# Patient Record
Sex: MALE | Race: WHITE | URBAN - METROPOLITAN AREA
[De-identification: names, ages, dates, MRNs, and addresses within clinical notes are randomized per-mention and may not be internally consistent; named-entity substitution may affect disease eponyms.]

---

## 2017-06-07 ENCOUNTER — APPOINTMENT (RX ONLY)
Dept: URBAN - METROPOLITAN AREA CLINIC 70 | Facility: CLINIC | Age: 82
Setting detail: DERMATOLOGY
End: 2017-06-07

## 2017-06-07 ENCOUNTER — APPOINTMENT (RX ONLY)
Dept: URBAN - METROPOLITAN AREA CLINIC 71 | Facility: CLINIC | Age: 82
Setting detail: DERMATOLOGY
End: 2017-06-07

## 2017-06-07 DIAGNOSIS — L30.4 ERYTHEMA INTERTRIGO: ICD-10-CM

## 2017-06-07 PROBLEM — I10 ESSENTIAL (PRIMARY) HYPERTENSION: Status: ACTIVE | Noted: 2017-06-07

## 2017-06-07 PROBLEM — J30.1 ALLERGIC RHINITIS DUE TO POLLEN: Status: ACTIVE | Noted: 2017-06-07

## 2017-06-07 PROBLEM — I25.10 ATHEROSCLEROTIC HEART DISEASE OF NATIVE CORONARY ARTERY WITHOUT ANGINA PECTORIS: Status: ACTIVE | Noted: 2017-06-07

## 2017-06-07 PROBLEM — K21.9 GASTRO-ESOPHAGEAL REFLUX DISEASE WITHOUT ESOPHAGITIS: Status: ACTIVE | Noted: 2017-06-07

## 2017-06-07 PROBLEM — E78.5 HYPERLIPIDEMIA, UNSPECIFIED: Status: ACTIVE | Noted: 2017-06-07

## 2017-06-07 PROBLEM — M12.9 ARTHROPATHY, UNSPECIFIED: Status: ACTIVE | Noted: 2017-06-07

## 2017-06-07 PROCEDURE — 99202 OFFICE O/P NEW SF 15 MIN: CPT

## 2017-06-07 PROCEDURE — ? TREATMENT REGIMEN

## 2017-06-07 PROCEDURE — ? PRESCRIPTION

## 2017-06-07 PROCEDURE — ? COUNSELING

## 2017-06-07 RX ORDER — HYDROCORTISONE 25 MG/G
OINTMENT TOPICAL
Qty: 1 | Refills: 2 | Status: ERX | COMMUNITY
Start: 2017-06-07

## 2017-06-07 RX ADMIN — HYDROCORTISONE: 25 OINTMENT TOPICAL at 00:00

## 2017-06-07 ASSESSMENT — LOCATION ZONE DERM
LOCATION ZONE: GENITALIA
LOCATION ZONE: GENITALIA

## 2017-06-07 ASSESSMENT — LOCATION DETAILED DESCRIPTION DERM
LOCATION DETAILED: GENITALIA
LOCATION DETAILED: GENITALIA

## 2017-06-07 ASSESSMENT — LOCATION SIMPLE DESCRIPTION DERM
LOCATION SIMPLE: GENITALIA
LOCATION SIMPLE: GENITALIA

## 2017-06-07 NOTE — PROCEDURE: TREATMENT REGIMEN
Otc Regimen: Recommend using a spray anti-perspirant in the groin to help keep the area dry
Initiate Treatment: Hydrocortisone 2.5% BID x 2 to 3 weeks PRN
Detail Level: Simple

## 2017-06-07 NOTE — PROCEDURE: TREATMENT REGIMEN
Initiate Treatment: Hydrocortisone 2.5% BID x 2 to 3 weeks PRN
Detail Level: Simple
Otc Regimen: Recommend using a spray anti-perspirant in the groin to help keep the area dry

## 2017-06-30 ENCOUNTER — APPOINTMENT (RX ONLY)
Dept: URBAN - METROPOLITAN AREA CLINIC 71 | Facility: CLINIC | Age: 82
Setting detail: DERMATOLOGY
End: 2017-06-30

## 2017-06-30 ENCOUNTER — APPOINTMENT (RX ONLY)
Dept: URBAN - METROPOLITAN AREA CLINIC 70 | Facility: CLINIC | Age: 82
Setting detail: DERMATOLOGY
End: 2017-06-30

## 2017-06-30 ENCOUNTER — RX ONLY (OUTPATIENT)
Age: 82
Setting detail: RX ONLY
End: 2017-06-30

## 2017-06-30 DIAGNOSIS — L30.4 ERYTHEMA INTERTRIGO: ICD-10-CM | Status: IMPROVED

## 2017-06-30 PROBLEM — K21.9 GASTRO-ESOPHAGEAL REFLUX DISEASE WITHOUT ESOPHAGITIS: Status: ACTIVE | Noted: 2017-06-30

## 2017-06-30 PROCEDURE — ? COUNSELING

## 2017-06-30 PROCEDURE — 99213 OFFICE O/P EST LOW 20 MIN: CPT

## 2017-06-30 PROCEDURE — ? TREATMENT REGIMEN

## 2017-06-30 RX ORDER — HYDROCORTISONE 25 MG/G
OINTMENT TOPICAL
Qty: 1 | Refills: 1 | Status: ERX

## 2017-06-30 ASSESSMENT — LOCATION SIMPLE DESCRIPTION DERM
LOCATION SIMPLE: GENITALIA
LOCATION SIMPLE: GENITALIA

## 2017-06-30 ASSESSMENT — LOCATION DETAILED DESCRIPTION DERM
LOCATION DETAILED: GENITALIA
LOCATION DETAILED: GENITALIA

## 2017-06-30 ASSESSMENT — LOCATION ZONE DERM
LOCATION ZONE: GENITALIA
LOCATION ZONE: GENITALIA

## 2017-06-30 NOTE — PROCEDURE: TREATMENT REGIMEN
Otc Regimen: Recommend using a spray anti-perspirant in the groin to help keep the area dry
Detail Level: Simple
Continue Regimen: Hydrocortisone 2.5% BID x 2 to 3 weeks PRN

## 2017-06-30 NOTE — PROCEDURE: TREATMENT REGIMEN
Detail Level: Simple
Continue Regimen: Hydrocortisone 2.5% BID x 2 to 3 weeks PRN
Otc Regimen: Recommend using a spray anti-perspirant in the groin to help keep the area dry

## 2017-07-26 ENCOUNTER — APPOINTMENT (RX ONLY)
Dept: URBAN - METROPOLITAN AREA CLINIC 71 | Facility: CLINIC | Age: 82
Setting detail: DERMATOLOGY
End: 2017-07-26

## 2017-07-26 ENCOUNTER — APPOINTMENT (RX ONLY)
Dept: URBAN - METROPOLITAN AREA CLINIC 70 | Facility: CLINIC | Age: 82
Setting detail: DERMATOLOGY
End: 2017-07-26

## 2017-07-26 DIAGNOSIS — L30.4 ERYTHEMA INTERTRIGO: ICD-10-CM | Status: INADEQUATELY CONTROLLED

## 2017-07-26 DIAGNOSIS — Z71.89 OTHER SPECIFIED COUNSELING: ICD-10-CM

## 2017-07-26 DIAGNOSIS — L98.8 OTHER SPECIFIED DISORDERS OF THE SKIN AND SUBCUTANEOUS TISSUE: ICD-10-CM

## 2017-07-26 PROCEDURE — ? COUNSELING

## 2017-07-26 PROCEDURE — ? TREATMENT REGIMEN

## 2017-07-26 PROCEDURE — 99213 OFFICE O/P EST LOW 20 MIN: CPT

## 2017-07-26 ASSESSMENT — LOCATION ZONE DERM
LOCATION ZONE: HAND
LOCATION ZONE: HAND
LOCATION ZONE: FACE
LOCATION ZONE: NECK
LOCATION ZONE: GENITALIA
LOCATION ZONE: NECK
LOCATION ZONE: FACE
LOCATION ZONE: ARM
LOCATION ZONE: GENITALIA
LOCATION ZONE: ARM

## 2017-07-26 ASSESSMENT — LOCATION SIMPLE DESCRIPTION DERM
LOCATION SIMPLE: GENITALIA
LOCATION SIMPLE: RIGHT SHOULDER
LOCATION SIMPLE: LEFT FOREHEAD
LOCATION SIMPLE: LEFT CHEEK
LOCATION SIMPLE: LEFT FOREHEAD
LOCATION SIMPLE: RIGHT HAND
LOCATION SIMPLE: POSTERIOR NECK
LOCATION SIMPLE: LEFT ANTERIOR NECK
LOCATION SIMPLE: POSTERIOR NECK
LOCATION SIMPLE: RIGHT SHOULDER
LOCATION SIMPLE: LEFT HAND
LOCATION SIMPLE: RIGHT HAND
LOCATION SIMPLE: LEFT CHEEK
LOCATION SIMPLE: LEFT HAND
LOCATION SIMPLE: GENITALIA
LOCATION SIMPLE: LEFT ANTERIOR NECK

## 2017-07-26 ASSESSMENT — LOCATION DETAILED DESCRIPTION DERM
LOCATION DETAILED: LEFT INFERIOR MEDIAL MALAR CHEEK
LOCATION DETAILED: LEFT INFERIOR MEDIAL MALAR CHEEK
LOCATION DETAILED: LEFT MEDIAL FOREHEAD
LOCATION DETAILED: LEFT RADIAL DORSAL HAND
LOCATION DETAILED: RIGHT ANTERIOR SHOULDER
LOCATION DETAILED: LEFT SUPERIOR ANTERIOR NECK
LOCATION DETAILED: LEFT MEDIAL TRAPEZIAL NECK
LOCATION DETAILED: RIGHT ULNAR DORSAL HAND
LOCATION DETAILED: GENITALIA
LOCATION DETAILED: GENITALIA
LOCATION DETAILED: LEFT MEDIAL TRAPEZIAL NECK
LOCATION DETAILED: LEFT MEDIAL FOREHEAD
LOCATION DETAILED: RIGHT ULNAR DORSAL HAND
LOCATION DETAILED: LEFT SUPERIOR ANTERIOR NECK
LOCATION DETAILED: LEFT RADIAL DORSAL HAND
LOCATION DETAILED: RIGHT ANTERIOR SHOULDER

## 2017-07-26 NOTE — PROCEDURE: TREATMENT REGIMEN
Discontinue Regimen: Anti-perspirant spray\\nHydrocortisone 2.5%cream
Initiate Treatment: Recommend vanicream cleansing bar\\nFluticasone ointment BID
Detail Level: Simple

## 2017-07-26 NOTE — PROCEDURE: TREATMENT REGIMEN
Initiate Treatment: Recommend vanicream cleansing bar\\nFluticasone ointment BID
Discontinue Regimen: Anti-perspirant spray\\nHydrocortisone 2.5%cream
Detail Level: Simple

## 2017-07-31 ENCOUNTER — RX ONLY (OUTPATIENT)
Age: 82
Setting detail: RX ONLY
End: 2017-07-31

## 2017-07-31 RX ORDER — HYDROCORTISONE 25 MG/G
CREAM TOPICAL
Qty: 1 | Refills: 2 | Status: ERX | COMMUNITY
Start: 2017-07-31

## 2017-08-01 ENCOUNTER — APPOINTMENT (RX ONLY)
Dept: URBAN - METROPOLITAN AREA CLINIC 71 | Facility: CLINIC | Age: 82
Setting detail: DERMATOLOGY
End: 2017-08-01

## 2017-08-01 ENCOUNTER — APPOINTMENT (RX ONLY)
Dept: URBAN - METROPOLITAN AREA CLINIC 70 | Facility: CLINIC | Age: 82
Setting detail: DERMATOLOGY
End: 2017-08-01

## 2017-08-01 DIAGNOSIS — L98.8 OTHER SPECIFIED DISORDERS OF THE SKIN AND SUBCUTANEOUS TISSUE: ICD-10-CM

## 2017-08-01 DIAGNOSIS — Z71.89 OTHER SPECIFIED COUNSELING: ICD-10-CM

## 2017-08-01 DIAGNOSIS — R20.2 PARESTHESIA OF SKIN: ICD-10-CM

## 2017-08-01 PROBLEM — M12.9 ARTHROPATHY, UNSPECIFIED: Status: ACTIVE | Noted: 2017-08-01

## 2017-08-01 PROCEDURE — ? PRESCRIPTION

## 2017-08-01 PROCEDURE — ? TREATMENT REGIMEN

## 2017-08-01 PROCEDURE — 99213 OFFICE O/P EST LOW 20 MIN: CPT

## 2017-08-01 PROCEDURE — ? COUNSELING

## 2017-08-01 RX ORDER — DOXYCYCLINE HYCLATE 100 MG/1
CAPSULE, GELATIN COATED ORAL
Qty: 28 | Refills: 0 | Status: ERX | COMMUNITY
Start: 2017-08-01

## 2017-08-01 RX ADMIN — DOXYCYCLINE HYCLATE: 100 CAPSULE, GELATIN COATED ORAL at 00:00

## 2017-08-01 ASSESSMENT — LOCATION ZONE DERM
LOCATION ZONE: PENIS
LOCATION ZONE: NECK
LOCATION ZONE: FACE
LOCATION ZONE: PENIS
LOCATION ZONE: HAND
LOCATION ZONE: GENITALIA
LOCATION ZONE: GENITALIA
LOCATION ZONE: ARM
LOCATION ZONE: NECK
LOCATION ZONE: HAND
LOCATION ZONE: FACE
LOCATION ZONE: ARM

## 2017-08-01 ASSESSMENT — LOCATION SIMPLE DESCRIPTION DERM
LOCATION SIMPLE: PENIS
LOCATION SIMPLE: LEFT FOREHEAD
LOCATION SIMPLE: LEFT ANTERIOR NECK
LOCATION SIMPLE: LEFT CHEEK
LOCATION SIMPLE: LEFT CHEEK
LOCATION SIMPLE: RIGHT SHOULDER
LOCATION SIMPLE: POSTERIOR NECK
LOCATION SIMPLE: RIGHT HAND
LOCATION SIMPLE: PENIS
LOCATION SIMPLE: LEFT ANTERIOR NECK
LOCATION SIMPLE: GENITALIA
LOCATION SIMPLE: GENITALIA
LOCATION SIMPLE: POSTERIOR NECK
LOCATION SIMPLE: LEFT HAND
LOCATION SIMPLE: LEFT HAND
LOCATION SIMPLE: RIGHT HAND
LOCATION SIMPLE: LEFT FOREHEAD
LOCATION SIMPLE: RIGHT SHOULDER

## 2017-08-01 ASSESSMENT — LOCATION DETAILED DESCRIPTION DERM
LOCATION DETAILED: LEFT SUPERIOR ANTERIOR NECK
LOCATION DETAILED: RIGHT ULNAR DORSAL HAND
LOCATION DETAILED: LEFT INFERIOR MEDIAL MALAR CHEEK
LOCATION DETAILED: RIGHT ANTERIOR SHOULDER
LOCATION DETAILED: LEFT MEDIAL FOREHEAD
LOCATION DETAILED: LEFT SUPERIOR ANTERIOR NECK
LOCATION DETAILED: LEFT INFERIOR MEDIAL MALAR CHEEK
LOCATION DETAILED: LEFT DORSAL SHAFT OF PENIS
LOCATION DETAILED: LEFT RADIAL DORSAL HAND
LOCATION DETAILED: LEFT MEDIAL TRAPEZIAL NECK
LOCATION DETAILED: LEFT DORSAL SHAFT OF PENIS
LOCATION DETAILED: RIGHT ANTERIOR SHOULDER
LOCATION DETAILED: RIGHT ULNAR DORSAL HAND
LOCATION DETAILED: LEFT MEDIAL TRAPEZIAL NECK
LOCATION DETAILED: GENITALIA
LOCATION DETAILED: LEFT MEDIAL FOREHEAD
LOCATION DETAILED: LEFT RADIAL DORSAL HAND
LOCATION DETAILED: GENITALIA

## 2017-08-16 ENCOUNTER — RX ONLY (OUTPATIENT)
Age: 82
Setting detail: RX ONLY
End: 2017-08-16

## 2017-08-16 ENCOUNTER — APPOINTMENT (RX ONLY)
Dept: URBAN - METROPOLITAN AREA CLINIC 71 | Facility: CLINIC | Age: 82
Setting detail: DERMATOLOGY
End: 2017-08-16

## 2017-08-16 ENCOUNTER — APPOINTMENT (RX ONLY)
Dept: URBAN - METROPOLITAN AREA CLINIC 70 | Facility: CLINIC | Age: 82
Setting detail: DERMATOLOGY
End: 2017-08-16

## 2017-08-16 DIAGNOSIS — R20.2 PARESTHESIA OF SKIN: ICD-10-CM

## 2017-08-16 PROCEDURE — ? COUNSELING

## 2017-08-16 PROCEDURE — 99213 OFFICE O/P EST LOW 20 MIN: CPT

## 2017-08-16 RX ORDER — LIDOCAINE/TETRACAINE 7 %-7 %
CREAM (GRAM) TOPICAL
Qty: 1 | Refills: 0 | Status: ERX | COMMUNITY
Start: 2017-08-16

## 2017-08-16 ASSESSMENT — LOCATION DETAILED DESCRIPTION DERM
LOCATION DETAILED: LEFT ANTERIOR SCROTUM
LOCATION DETAILED: LEFT ANTERIOR SCROTUM
LOCATION DETAILED: RIGHT ANTERIOR SCROTUM
LOCATION DETAILED: RIGHT ANTERIOR SCROTUM

## 2017-08-16 ASSESSMENT — LOCATION ZONE DERM
LOCATION ZONE: GENITALIA
LOCATION ZONE: GENITALIA

## 2017-08-16 ASSESSMENT — LOCATION SIMPLE DESCRIPTION DERM
LOCATION SIMPLE: SCROTUM
LOCATION SIMPLE: SCROTUM

## 2018-01-31 ENCOUNTER — APPOINTMENT (RX ONLY)
Dept: URBAN - METROPOLITAN AREA CLINIC 71 | Facility: CLINIC | Age: 83
Setting detail: DERMATOLOGY
End: 2018-01-31

## 2018-01-31 ENCOUNTER — APPOINTMENT (RX ONLY)
Dept: URBAN - METROPOLITAN AREA CLINIC 70 | Facility: CLINIC | Age: 83
Setting detail: DERMATOLOGY
End: 2018-01-31

## 2018-01-31 DIAGNOSIS — R20.2 PARESTHESIA OF SKIN: ICD-10-CM | Status: INADEQUATELY CONTROLLED

## 2018-01-31 DIAGNOSIS — Z71.89 OTHER SPECIFIED COUNSELING: ICD-10-CM

## 2018-01-31 DIAGNOSIS — R21 RASH AND OTHER NONSPECIFIC SKIN ERUPTION: ICD-10-CM

## 2018-01-31 PROBLEM — J30.1 ALLERGIC RHINITIS DUE TO POLLEN: Status: ACTIVE | Noted: 2018-01-31

## 2018-01-31 PROBLEM — I10 ESSENTIAL (PRIMARY) HYPERTENSION: Status: ACTIVE | Noted: 2018-01-31

## 2018-01-31 PROBLEM — I25.10 ATHEROSCLEROTIC HEART DISEASE OF NATIVE CORONARY ARTERY WITHOUT ANGINA PECTORIS: Status: ACTIVE | Noted: 2018-01-31

## 2018-01-31 PROCEDURE — ? TREATMENT REGIMEN

## 2018-01-31 PROCEDURE — ? BIOPSY BY PUNCH METHOD

## 2018-01-31 PROCEDURE — 11100: CPT

## 2018-01-31 PROCEDURE — 99214 OFFICE O/P EST MOD 30 MIN: CPT | Mod: 25

## 2018-01-31 PROCEDURE — ? COUNSELING

## 2018-01-31 PROCEDURE — ? PRESCRIPTION

## 2018-01-31 RX ORDER — HYDROCORTISONE 25 MG/G
OINTMENT TOPICAL
Qty: 1 | Refills: 1 | Status: ERX

## 2018-01-31 ASSESSMENT — LOCATION ZONE DERM
LOCATION ZONE: GENITALIA
LOCATION ZONE: GENITALIA

## 2018-01-31 ASSESSMENT — LOCATION DETAILED DESCRIPTION DERM
LOCATION DETAILED: RIGHT ANTERIOR SCROTUM
LOCATION DETAILED: LEFT SCROTUM
LOCATION DETAILED: LEFT SCROTUM
LOCATION DETAILED: RIGHT SCROTUM
LOCATION DETAILED: LEFT ANTERIOR SCROTUM
LOCATION DETAILED: RIGHT SCROTUM
LOCATION DETAILED: RIGHT ANTERIOR SCROTUM
LOCATION DETAILED: LEFT ANTERIOR SCROTUM

## 2018-01-31 ASSESSMENT — LOCATION SIMPLE DESCRIPTION DERM
LOCATION SIMPLE: SCROTUM
LOCATION SIMPLE: SCROTUM

## 2018-01-31 NOTE — PROCEDURE: TREATMENT REGIMEN
Detail Level: Simple
Initiate Treatment: Hydrocortisone oint 2.5% bid
Discontinue Regimen: Set aside Hydrocortisone cream

## 2018-01-31 NOTE — PROCEDURE: BIOPSY BY PUNCH METHOD
Anesthesia Volume In Cc: 3
Hemostasis: None
Anesthesia Type: 1% lidocaine without epinephrine
Patient Will Remove Sutures At Home?: No
Notification Instructions: 7 days s/o
Home Suture Removal Text: Patient was provided a home suture removal kit and will remove their sutures at home.  If they have any questions or difficulties they will call the office.
Suture Removal: 10 days
Post-Care Instructions: I reviewed with the patient in detail post-care instructions. Patient is to keep the biopsy site dry overnight, and then apply bacitracin twice daily until healed. Patient may apply hydrogen peroxide soaks to remove any crusting.
Size Of Lesion In Cm (Optional): 0
Dressing: bandage
Additional Anesthesia Volume In Cc (Will Not Render If 0): 1
Punch Size In Mm: 4
Wound Care: Vaniply
Detail Level: Detailed
Billing Type: Third-Party Bill
Consent: Written consent was obtained and risks were reviewed including but not limited to scarring, infection, bleeding, scabbing, incomplete removal, nerve damage and allergy to anesthesia.
Biopsy Type: H and E
Epidermal Sutures: 4-0 Nylon

## 2018-01-31 NOTE — PROCEDURE: BIOPSY BY PUNCH METHOD
Bill [id number] For Specimen Handling/Conveyance To Laboratory?: no
Lab: [id number]
Punch Size In Mm: 4
Additional Anesthesia Volume In Cc (Will Not Render If 0): 1
Billing Type: Third-Party Bill
Wound Care: Vaniply
Hemostasis: None
X Size Of Lesion In Cm (Optional): 0
Anesthesia Type: 1% lidocaine without epinephrine
Lab Facility: [id number]
Detail Level: Detailed
Suture Removal: 10 days
Consent: Written consent was obtained and risks were reviewed including but not limited to scarring, infection, bleeding, scabbing, incomplete removal, nerve damage and allergy to anesthesia.
Dressing: bandage
Post-Care Instructions: I reviewed with the patient in detail post-care instructions. Patient is to keep the biopsy site dry overnight, and then apply bacitracin twice daily until healed. Patient may apply hydrogen peroxide soaks to remove any crusting.
Notification Instructions: 7 days s/o
Biopsy Type: H and E
Home Suture Removal Text: Patient was provided a home suture removal kit and will remove their sutures at home.  If they have any questions or difficulties they will call the office.
Anesthesia Volume In Cc: 3
Epidermal Sutures: 4-0 Nylon

## 2018-02-13 ENCOUNTER — APPOINTMENT (RX ONLY)
Dept: URBAN - METROPOLITAN AREA CLINIC 71 | Facility: CLINIC | Age: 83
Setting detail: DERMATOLOGY
End: 2018-02-13

## 2018-02-13 ENCOUNTER — APPOINTMENT (RX ONLY)
Dept: URBAN - METROPOLITAN AREA CLINIC 70 | Facility: CLINIC | Age: 83
Setting detail: DERMATOLOGY
End: 2018-02-13

## 2018-02-13 DIAGNOSIS — R20.2 PARESTHESIA OF SKIN: ICD-10-CM

## 2018-02-13 PROBLEM — E78.5 HYPERLIPIDEMIA, UNSPECIFIED: Status: ACTIVE | Noted: 2018-02-13

## 2018-02-13 PROCEDURE — ? TREATMENT REGIMEN

## 2018-02-13 PROCEDURE — ? COUNSELING

## 2018-02-13 PROCEDURE — 99213 OFFICE O/P EST LOW 20 MIN: CPT

## 2018-02-13 ASSESSMENT — LOCATION SIMPLE DESCRIPTION DERM
LOCATION SIMPLE: SCROTUM
LOCATION SIMPLE: SCROTUM

## 2018-02-13 ASSESSMENT — LOCATION ZONE DERM
LOCATION ZONE: GENITALIA
LOCATION ZONE: GENITALIA

## 2018-02-13 ASSESSMENT — LOCATION DETAILED DESCRIPTION DERM
LOCATION DETAILED: RIGHT SCROTUM
LOCATION DETAILED: LEFT SCROTUM
LOCATION DETAILED: LEFT SCROTUM
LOCATION DETAILED: RIGHT SCROTUM

## 2018-02-13 NOTE — PROCEDURE: TREATMENT REGIMEN
Initiate Treatment: Benadryl at bedtime\\nA & D ointment PRN
Detail Level: Simple
Continue Regimen: Hydrocortisone 2.5% BID, keep refrigerated

## 2019-06-13 ENCOUNTER — HOSPITAL ENCOUNTER (OUTPATIENT)
Dept: HOSPITAL 61 - SURGPAT | Age: 84
Discharge: HOME | End: 2019-06-13
Attending: NEUROLOGICAL SURGERY
Payer: MEDICARE

## 2019-06-13 DIAGNOSIS — Z91.048: ICD-10-CM

## 2019-06-13 DIAGNOSIS — Z91.040: ICD-10-CM

## 2019-06-13 DIAGNOSIS — Z01.818: Primary | ICD-10-CM

## 2019-06-13 DIAGNOSIS — M48.062: ICD-10-CM

## 2019-06-13 LAB
ALBUMIN SERPL-MCNC: 3.5 G/DL (ref 3.4–5)
ALBUMIN/GLOB SERPL: 1.1 {RATIO} (ref 1–1.7)
ALP SERPL-CCNC: 58 U/L (ref 46–116)
ALT SERPL-CCNC: 24 U/L (ref 16–63)
ANION GAP SERPL CALC-SCNC: 10 MMOL/L (ref 6–14)
AST SERPL-CCNC: 17 U/L (ref 15–37)
BASOPHILS # BLD AUTO: 0 X10^3/UL (ref 0–0.2)
BASOPHILS NFR BLD: 1 % (ref 0–3)
BILIRUB SERPL-MCNC: 0.5 MG/DL (ref 0.2–1)
BUN SERPL-MCNC: 28 MG/DL (ref 8–26)
BUN/CREAT SERPL: 31 (ref 6–20)
CALCIUM SERPL-MCNC: 9.2 MG/DL (ref 8.5–10.1)
CHLORIDE SERPL-SCNC: 98 MMOL/L (ref 98–107)
CO2 SERPL-SCNC: 25 MMOL/L (ref 21–32)
CREAT SERPL-MCNC: 0.9 MG/DL (ref 0.7–1.3)
EOSINOPHIL NFR BLD: 0.1 X10^3/UL (ref 0–0.7)
EOSINOPHIL NFR BLD: 1 % (ref 0–3)
ERYTHROCYTE [DISTWIDTH] IN BLOOD BY AUTOMATED COUNT: 13.6 % (ref 11.5–14.5)
GFR SERPLBLD BASED ON 1.73 SQ M-ARVRAT: 81 ML/MIN
GLOBULIN SER-MCNC: 3.3 G/DL (ref 2.2–3.8)
GLUCOSE SERPL-MCNC: 134 MG/DL (ref 70–99)
HCT VFR BLD CALC: 40.9 % (ref 39–53)
HGB BLD-MCNC: 14.1 G/DL (ref 13–17.5)
LYMPHOCYTES # BLD: 1.3 X10^3/UL (ref 1–4.8)
LYMPHOCYTES NFR BLD AUTO: 16 % (ref 24–48)
MCH RBC QN AUTO: 30 PG (ref 25–35)
MCHC RBC AUTO-ENTMCNC: 35 G/DL (ref 31–37)
MCV RBC AUTO: 87 FL (ref 79–100)
MONO #: 0.8 X10^3/UL (ref 0–1.1)
MONOCYTES NFR BLD: 10 % (ref 0–9)
NEUT #: 5.8 X10^3UL (ref 1.8–7.7)
NEUTROPHILS NFR BLD AUTO: 72 % (ref 31–73)
PLATELET # BLD AUTO: 230 X10^3/UL (ref 140–400)
POTASSIUM SERPL-SCNC: 4.3 MMOL/L (ref 3.5–5.1)
PROT SERPL-MCNC: 6.8 G/DL (ref 6.4–8.2)
RBC # BLD AUTO: 4.72 X10^6/UL (ref 4.3–5.7)
SODIUM SERPL-SCNC: 133 MMOL/L (ref 136–145)
WBC # BLD AUTO: 8 X10^3/UL (ref 4–11)

## 2019-06-13 PROCEDURE — 80053 COMPREHEN METABOLIC PANEL: CPT

## 2019-06-13 PROCEDURE — 36415 COLL VENOUS BLD VENIPUNCTURE: CPT

## 2019-06-13 PROCEDURE — 85025 COMPLETE CBC W/AUTO DIFF WBC: CPT

## 2019-06-13 PROCEDURE — 87641 MR-STAPH DNA AMP PROBE: CPT

## 2019-06-20 NOTE — PREOP HP
DATE OF SERVICE:  06/21/2019



Ruslan Barriga RN dictating for Dr. Davi Avila.



DATE OF SURGERY:  06/21/2019



HISTORY OF PRESENT ILLNESS:  The patient is a pleasant 83-year-old who underwent

lumbar fusion in 1963 and did well from that.  Ten years ago, he underwent

surgery at L1-L2 and L3-L4 with decompression and did well again.  His current

problem has been present over the last year or so and is slowly progressive.  He

has pain in his left hip and feelings of weakness.  Standing and walking

increases feelings of weakness and he complains of immobility of the left hip. 

He takes gabapentin, which he says helps him.  He has had pain management

including epidural steroid injections, which have not given him significant

relief as recently as 1 year ago.  He ambulates with a cane.



PAST MEDICAL HISTORY:  Arthritis, artificial knees bilaterally, hepatitis A,

hypertension, diabetes.



PAST SURGICAL HISTORY:  Lumbar surgery in 1963; cholecystectomy; esophageal

dilation 1987, 1997; esophageal split 1998; esophageal surgery in 1999;

bilateral knee replacement in 2006; colonoscopy in 2010; cervical surgery in

2010.



FAMILY HISTORY:  Diabetes and hypertension.



SOCIAL HISTORY:  Retired.  .  Exercises daily.  Smokes cigars.  Denies

drinking alcohol.  Drinks coffee.



ALLERGIES:  LATEX and ADHESIVE TAPE.



CURRENT MEDICATIONS:  Hydrochlorothiazide, metformin, losartan, metoclopramide,

omeprazole, gabapentin.



REVIEW OF SYSTEMS:  A 12-point review of systems was obtained and is

noncontributory except that mentioned above.



PHYSICAL EXAMINATION:

NEUROSURGERY EXAMINATION:

GENERAL APPEARANCE:  Alert, pleasant, in no acute distress.

HEAD:  Normocephalic and atraumatic.

SKIN:  Warm and dry.  Well-healed lumbar incision.

MUSCULOSKELETAL:  Lumbar paraspinal muscle bulk is normal, restricted range of

motion of the lumbar spine, mild-to-moderate tenderness of lower lumbar spine

with palpation, normal range of motion of the lower extremities bilaterally.

EXTREMITIES:  No clubbing, cyanosis or edema.

NEUROLOGIC:  Alert and oriented x 3, normal recent and remote memory, strength

5/5 in bilateral lower extremities, sensory was intact to light touch in

bilateral lower extremities.  Reflexes were trace and symmetric in the lower

extremities bilaterally, negative straight leg raising bilaterally, ambulates

with a cane.



IMAGING:  Reviewed.  I reviewed a lumbar MRI scan.  There are extensive

postoperative changes at L2-L3 and there are more moderate central canal

stenosis.  However, at L3-L4, there is severe canal stenosis present at L4

through sacrum, he has undergone extensive fusion surgery.



ASSESSMENT:  Spinal stenosis, lumbar region with neurogenic claudication.



PLAN:  His problems are neurogenic claudication type pattern with increasing

problems in the hip and inguinal region, as well as anterior lateral thigh with

standing and activities, and is improved with sitting.  At this point, I feel

that the severe stenosis at L3-L4 should be addressed.  I outlined the surgery

and the risks with both the patient and his wife and son.  I would perform the

operation with BrainLAB guidance and that he has had such extensive surgery in

the past.  He understands my concerns.  He would like to go ahead.  We will make

the arrangements.

 



______________________________

DAVI AVILA MD



DR:  WILLIAN/lizbet  JOB#:  [id number] / [id number]

DD:  06/20/2019 09:29  DT:  06/20/2019 09:51

## 2019-06-21 ENCOUNTER — HOSPITAL ENCOUNTER (OUTPATIENT)
Dept: HOSPITAL 61 - SURG | Age: 84
Setting detail: OBSERVATION
Discharge: HOME | End: 2019-06-21
Attending: NEUROLOGICAL SURGERY | Admitting: NEUROLOGICAL SURGERY
Payer: MEDICARE

## 2019-06-21 VITALS — WEIGHT: 251.33 LBS | BODY MASS INDEX: 35.19 KG/M2 | HEIGHT: 71 IN

## 2019-06-21 VITALS — DIASTOLIC BLOOD PRESSURE: 65 MMHG | SYSTOLIC BLOOD PRESSURE: 143 MMHG

## 2019-06-21 DIAGNOSIS — Z98.1: ICD-10-CM

## 2019-06-21 DIAGNOSIS — R53.1: ICD-10-CM

## 2019-06-21 DIAGNOSIS — Z83.3: ICD-10-CM

## 2019-06-21 DIAGNOSIS — F17.290: ICD-10-CM

## 2019-06-21 DIAGNOSIS — Z96.653: ICD-10-CM

## 2019-06-21 DIAGNOSIS — I10: ICD-10-CM

## 2019-06-21 DIAGNOSIS — B15.9: ICD-10-CM

## 2019-06-21 DIAGNOSIS — M48.062: Primary | ICD-10-CM

## 2019-06-21 DIAGNOSIS — Z82.49: ICD-10-CM

## 2019-06-21 DIAGNOSIS — E11.9: ICD-10-CM

## 2019-06-21 DIAGNOSIS — M19.90: ICD-10-CM

## 2019-06-21 PROCEDURE — G0379 DIRECT REFER HOSPITAL OBSERV: HCPCS

## 2019-06-21 PROCEDURE — 76000 FLUOROSCOPY <1 HR PHYS/QHP: CPT

## 2019-06-21 PROCEDURE — G0378 HOSPITAL OBSERVATION PER HR: HCPCS

## 2019-06-21 PROCEDURE — 88304 TISSUE EXAM BY PATHOLOGIST: CPT

## 2019-06-21 PROCEDURE — 72131 CT LUMBAR SPINE W/O DYE: CPT

## 2019-06-21 PROCEDURE — 97162 PT EVAL MOD COMPLEX 30 MIN: CPT

## 2019-06-21 PROCEDURE — 82962 GLUCOSE BLOOD TEST: CPT

## 2019-06-21 PROCEDURE — A7015 AEROSOL MASK USED W NEBULIZE: HCPCS

## 2019-06-21 PROCEDURE — 88311 DECALCIFY TISSUE: CPT

## 2019-06-21 PROCEDURE — 63047 LAM FACETEC & FORAMOT LUMBAR: CPT

## 2019-06-21 NOTE — OP
DATE OF SURGERY:  06/21/2019



PREOPERATIVE DIAGNOSES:  Extensive previous lumbar surgeries including fusion

L3-L4, with severe lumbar spinal stenosis and radiculopathy.



POSTOPERATIVE DIAGNOSES:  Extensive previous lumbar surgeries including fusion

L3-L4, with severe lumbar spinal stenosis and radiculopathy.



OPERATION PERFORMED:  Left extensive hemilaminotomy with microdecompression of

dura and nerve root L3-L4, left, with microdiscectomy L3-L4, left.  The

operation required BrainLAB guidance, multimodality monitoring including EMG,

SSEP, fluoroscopy, microscopic dissection.



SURGEON: Davi Avila M.D.



ASSISTANT:  HEBERT Smith assisted with the surgery.  She assisted with

the exposure, the microdecompression as well as the closure.



OPERATIVE INDICATIONS:  The patient is a pleasant 83-year-old man who developed

intractable back and first left hip pain and then left hip and leg weakness,

severe exercise intolerance because of pain in this location, which failed

conservative measures including epidural steroid injections.  On imaging

studies, he was found to have severe stenosis at L3-L4 and evidence of a

previous extensive surgery with a fusion from the superior L4 to the sacrum

combined with considerable scarring and I recommended lumbar microdecompressive

surgery to see if this would not help him.  Because of the loss of landmarks

from his extensive surgery, I recommended the use of BrainLAB guidance to help

with this operation.  He understood the surgery, the risks.  He wished to go

ahead.



DESCRIPTION OF PROCEDURE:  Following general endotracheal anesthesia, the

patient was positioned prone on the Johann table.  Lumbar region prepped and

draped in standard fashion.  FRANCI hose and AV impulse boots were applied for DVT

prophylaxis.  A microscope was draped.  Fluoroscopy was draped and brought into

the field.  Monitoring was established.  Ancef 2 grams was given less than 1

hour prior to initiation of surgery.  The BrainLAB system was initialized after

iliac posts were placed in the right iliac crest and then, a midline incision

was made extending from mid L3 to mid L4, dissected down through skin and

subcutaneous tissue, reflected the paraspinal muscles and then began to work

down through the dense scar to expose the area of his previous surgery and

fusion.  I placed a [place] microdisk retractor.  I brought in the microscope and

the remainder of surgery with the microscope using microscopic technique.  Using

the high speed air drill, I spent a considerable amount of time drilling through

fusion bone and scar and exposed L3-L4 on the left.  I

encountered a very thickened calcified ligamentum flavum, which was densely

scarred to the underlying dura and began to gently and methodically peeled this

back.  I did perform a generous foraminotomy and exposed the entire region.  As

I peeled away thickened ligamentum flavum, I was obtained an excellent

decompression and I was able to peel all the ligament away exposing the

underlying dura, clearly visualized the common dural sac and the exiting root. 

I gently retracted the root medially.  The disc was bulging and had a calcified

rim as above and below, which was lifting the nerve posteriorly.  I gently

retracted the nerve medially and using the high speed air drill, I drilled down

these spurs.  The disc itself was not particularly soft.  I did remove some

portion of the disc, which was exerting posterior pressure, but overall after

this decompression and a partial removal of the disc, the region was very well

decompressed.  I explored carefully.  I did use small amounts of bone wax, and

cautery. I irrigated copiously.  Hemostasis was excellent.  At this point, the

dura was pulsating gently, the pressure was removed.  Dura had moved quite far

laterally with the decompression.  I irrigated and removed the retractor,

obtained hemostasis in the muscle and closed the wound in layers with absorbable

suture.  The skin was closed with 4-0 subcuticular stitch.  The operation went 
very

well and the patient awakened in recovery room in excellent condition.

 



______________________________

DAVI AVILA MD



DR:  WILLIAN/lizbet  JOB#:  [id number] / [id number]

DD:  06/21/2019 17:50  DT:  06/21/2019 18:23

FRANNIE

## 2019-06-21 NOTE — OP
DATE OF SURGERY:  06/21/2019



PREOPERATIVE DIAGNOSES:

1.  Previous extensive lumbar surgery with reoperation in the past, L3-L4, left.

2.  Lumbar



Dictation ends here.

 



______________________________

KELLY AVILA MD



DR:  WILLIAN/lizbet  JOB#:  [id number] / [id number]

DD:  06/21/2019 17:37  DT:  06/21/2019 17:54

## 2019-06-21 NOTE — RAD
CT of the lumbar spine without contrast, 6/21/2019:



HISTORY: Lumbar stenosis, brain lab study



Noncontrast scans were obtained with multiplanar reconstructions produced. 

The data was transferred to the operating room to aid in the patient's

stereotactically guided surgery.  The following findings are delineated:



1.  There is moderate hypertrophic spurring throughout the lumbar spine with

bony bridging anteriorly at multiple levels.  There are extensive degenerative

changes involving the facet joints bilaterally with fusion of some of those

facet joints.  No fracture or subluxation is evident.



2.  At L1-2 there is moderate broad-based posterior disc bulging.  There is

posterior ligamentous thickening.  The combination of findings is causing

moderate central spinal stenosis with the thecal sac measuring approximate 6

mm in AP diameter at the midline.



3.  At L2-3 there is moderate posterior marginal spurring which is most

prominent laterally on both sides.  There are moderate degenerative changes

involving the facet joints.  The combination of findings is causing moderate

central spinal stenosis similar to that seen at L1-2.



3.  At L3-4 there is moderate broad-based posterior disc bulging and marginal

spurring.  There are moderate degenerative changes involving the facet joints.

 The combination of findings is causing moderate central spinal stenosis in a

triangular configuration with the thecal sac measuring approximate 5 mm in AP

diameter at the midline.  There is moderate inferior foraminal narrowing on

the left.



4.  At L4-5 there is mild posterior spurring.  There is fusion of the

posterior elements at L4-5 and L5-S1 which is likely postsurgical.  No

significant central spinal stenosis is present at this level.  There is only

mild inferior foraminal narrowing due to the spurring at this level.



At L5-S1 there is moderate posterior spurring at the midline.  The thecal sac

measures 10 mm in AP diameter at the midline.  There is only mild inferior

foraminal narrowing bilaterally.



Incidental note is made of a large hiatal hernia extending to the right.





PQRS Compliance Statement:



One or more of the following individualized dose reduction techniques were

utilized for this examination:

1. Automated exposure control

2. Adjustment of the mA and/or kV according to patient size

3. Use of iterative reconstruction technique











PQRS Compliance Statement:



One or more of the following individualized dose reduction techniques were

utilized for this examination:

1. Automated exposure control

2. Adjustment of the mA and/or kV according to patient size

3. Use of iterative reconstruction technique

## 2019-06-21 NOTE — DISCH
DISCHARGE INSTRUCTIONS


Condition on Discharge


Condition on Discharge:  Stable





Activity After Discharge


Activity Instructions for Disc:  Activity as tolerated, Avoid exertion


Other activity instructions:  no not drive fro a week


Bathing Instructions:  Shower-keep dressing dry


Lifting Instructions after Dis:  No heavy lifting, No pulling or pushing, Do not

lift >10 pounds





Diet after Discharge


Additional Diet Restrictions:  resume home diet





Wound Incision Care


Wound/Incision Care:  Ice to area for comfort


Other wound/incision instructi:  may remove dressing tomorrow if dry, no soaking





Contacting the DRCatherine after DC


Call your doctor for:  Concerns you may have





Follow-Up


Follow up with:  Dr. Avila's nurse in 2 weeks [phone]











KELLY AVILA MD            Jun 21, 2019 12:28

## 2019-06-25 NOTE — PATHOLOGY
[facility] Accession Number: [id number]

.                                                                01

Material submitted:                                        .

vertebral column - LUMBAR DECOMPRESSION

.                                                                01

Clinical history:                                          .

Lumbar stenosis with neurogenic claudication.

.                                                                02

**********************************************************************

Diagnosis:

Segments of fibrocartilaginous, fibroadipose, and skeletal muscle tissue

and bone, lumbar decompression:

- Degenerative changes of fibrocartilaginous tissue.

(JPM:taye; 06/25/2019)

QMS/06/25/2019

**********************************************************************

.                                                                02

Comment:

There is no evidence of an acute inflammatory process or malignancy.

(JPM:taye; 06/25/2019)

.                                                                02

Electronically signed:                                     .

Keagan Franco MD, Pathologist

NPI- [id number]

.                                                                01

Gross description:                                         .

Received in formalin labeled "Henry Ang, lumbar decompression" is a

5.7 x 4.5 x 1.3 cm aggregate of tan-pink fibrous tissue and tan-white bony

fragments.  Representative tissue is submitted in cassette A1 following

decalcification. ([facility]; 6/23/2019)

SYC/SYC

.                                                                02

Pathologist provided ICD-10:

M51.36

.                                                                02

CPT                                                        .

[id number], [id number]

Specimen Comment: A courtesy copy of this report has been sent to

Specimen Comment: [phone].

Specimen Comment: Report sent to 

***Performed at:  01

   [facility]

   [address], [place]  [id number]

   MD Sagar Mae MD Phone:  [phone]

***Performed at:  02

   [facility]

   [address], [place]  [id number]

   MD Keagan Franco MD Phone:  [phone]